# Patient Record
Sex: MALE
[De-identification: names, ages, dates, MRNs, and addresses within clinical notes are randomized per-mention and may not be internally consistent; named-entity substitution may affect disease eponyms.]

---

## 2017-10-15 ENCOUNTER — TRANSCRIPTION ENCOUNTER (OUTPATIENT)
Age: 47
End: 2017-10-15

## 2017-10-15 ENCOUNTER — EMERGENCY (EMERGENCY)
Facility: HOSPITAL | Age: 47
LOS: 0 days | Discharge: HOME | End: 2017-10-16
Attending: EMERGENCY MEDICINE | Admitting: EMERGENCY MEDICINE
Payer: MEDICAID

## 2017-10-15 VITALS
OXYGEN SATURATION: 100 % | RESPIRATION RATE: 17 BRPM | TEMPERATURE: 98 F | WEIGHT: 169.98 LBS | SYSTOLIC BLOOD PRESSURE: 124 MMHG | HEART RATE: 69 BPM | HEIGHT: 70 IN | DIASTOLIC BLOOD PRESSURE: 78 MMHG

## 2017-10-15 DIAGNOSIS — R07.9 CHEST PAIN, UNSPECIFIED: ICD-10-CM

## 2017-10-15 LAB
ADD ON TEST-SPECIMEN IN LAB: SIGNIFICANT CHANGE UP
ALBUMIN SERPL ELPH-MCNC: 3.4 G/DL — SIGNIFICANT CHANGE UP (ref 3.3–5)
ALP SERPL-CCNC: 91 U/L — SIGNIFICANT CHANGE UP (ref 40–120)
ALT FLD-CCNC: 74 U/L — SIGNIFICANT CHANGE UP (ref 12–78)
ANION GAP SERPL CALC-SCNC: 5 MMOL/L — SIGNIFICANT CHANGE UP (ref 5–17)
APTT BLD: 37.4 SEC — SIGNIFICANT CHANGE UP (ref 27.5–37.4)
AST SERPL-CCNC: 32 U/L — SIGNIFICANT CHANGE UP (ref 15–37)
BASOPHILS # BLD AUTO: 0.1 K/UL — SIGNIFICANT CHANGE UP (ref 0–0.2)
BASOPHILS NFR BLD AUTO: 1.4 % — SIGNIFICANT CHANGE UP (ref 0–2)
BILIRUB SERPL-MCNC: 0.3 MG/DL — SIGNIFICANT CHANGE UP (ref 0.2–1.2)
BUN SERPL-MCNC: 13 MG/DL — SIGNIFICANT CHANGE UP (ref 7–23)
CALCIUM SERPL-MCNC: 8.8 MG/DL — SIGNIFICANT CHANGE UP (ref 8.5–10.1)
CHLORIDE SERPL-SCNC: 104 MMOL/L — SIGNIFICANT CHANGE UP (ref 96–108)
CO2 SERPL-SCNC: 29 MMOL/L — SIGNIFICANT CHANGE UP (ref 22–31)
CREAT SERPL-MCNC: 0.94 MG/DL — SIGNIFICANT CHANGE UP (ref 0.5–1.3)
D DIMER BLD IA.RAPID-MCNC: <150 NG/ML DDU — SIGNIFICANT CHANGE UP
EOSINOPHIL # BLD AUTO: 0.4 K/UL — SIGNIFICANT CHANGE UP (ref 0–0.5)
EOSINOPHIL NFR BLD AUTO: 3.9 % — SIGNIFICANT CHANGE UP (ref 0–6)
GLUCOSE SERPL-MCNC: 93 MG/DL — SIGNIFICANT CHANGE UP (ref 70–99)
HCT VFR BLD CALC: 43.3 % — SIGNIFICANT CHANGE UP (ref 39–50)
HGB BLD-MCNC: 14.6 G/DL — SIGNIFICANT CHANGE UP (ref 13–17)
INR BLD: 1.04 RATIO — SIGNIFICANT CHANGE UP (ref 0.88–1.16)
LIDOCAIN IGE QN: 91 U/L — SIGNIFICANT CHANGE UP (ref 73–393)
LYMPHOCYTES # BLD AUTO: 4.9 K/UL — HIGH (ref 1–3.3)
LYMPHOCYTES # BLD AUTO: 45.2 % — HIGH (ref 13–44)
MCHC RBC-ENTMCNC: 28.8 PG — SIGNIFICANT CHANGE UP (ref 27–34)
MCHC RBC-ENTMCNC: 33.8 GM/DL — SIGNIFICANT CHANGE UP (ref 32–36)
MCV RBC AUTO: 85.2 FL — SIGNIFICANT CHANGE UP (ref 80–100)
MONOCYTES # BLD AUTO: 0.8 K/UL — SIGNIFICANT CHANGE UP (ref 0–0.9)
MONOCYTES NFR BLD AUTO: 7.1 % — SIGNIFICANT CHANGE UP (ref 2–14)
NEUTROPHILS # BLD AUTO: 4.6 K/UL — SIGNIFICANT CHANGE UP (ref 1.8–7.4)
NEUTROPHILS NFR BLD AUTO: 42.5 % — LOW (ref 43–77)
NT-PROBNP SERPL-SCNC: 12 PG/ML — SIGNIFICANT CHANGE UP (ref 0–125)
PLATELET # BLD AUTO: 283 K/UL — SIGNIFICANT CHANGE UP (ref 150–400)
POTASSIUM SERPL-MCNC: 4.3 MMOL/L — SIGNIFICANT CHANGE UP (ref 3.5–5.3)
POTASSIUM SERPL-SCNC: 4.3 MMOL/L — SIGNIFICANT CHANGE UP (ref 3.5–5.3)
PROT SERPL-MCNC: 7.4 GM/DL — SIGNIFICANT CHANGE UP (ref 6–8.3)
PROTHROM AB SERPL-ACNC: 11.2 SEC — SIGNIFICANT CHANGE UP (ref 9.8–12.7)
RBC # BLD: 5.08 M/UL — SIGNIFICANT CHANGE UP (ref 4.2–5.8)
RBC # FLD: 12.5 % — SIGNIFICANT CHANGE UP (ref 10.3–14.5)
SODIUM SERPL-SCNC: 138 MMOL/L — SIGNIFICANT CHANGE UP (ref 135–145)
TROPONIN I SERPL-MCNC: <0.015 NG/ML — SIGNIFICANT CHANGE UP (ref 0.01–0.04)
TROPONIN I SERPL-MCNC: <0.015 NG/ML — SIGNIFICANT CHANGE UP (ref 0.01–0.04)
WBC # BLD: 10.8 K/UL — HIGH (ref 3.8–10.5)
WBC # FLD AUTO: 10.8 K/UL — HIGH (ref 3.8–10.5)

## 2017-10-15 PROCEDURE — 93010 ELECTROCARDIOGRAM REPORT: CPT

## 2017-10-15 PROCEDURE — 71010: CPT | Mod: 26

## 2017-10-15 PROCEDURE — 99285 EMERGENCY DEPT VISIT HI MDM: CPT

## 2017-10-15 RX ORDER — SODIUM CHLORIDE 9 MG/ML
3 INJECTION INTRAMUSCULAR; INTRAVENOUS; SUBCUTANEOUS ONCE
Refills: 0 | Status: COMPLETED | OUTPATIENT
Start: 2017-10-15 | End: 2017-10-15

## 2017-10-15 RX ADMIN — SODIUM CHLORIDE 3 MILLILITER(S): 9 INJECTION INTRAMUSCULAR; INTRAVENOUS; SUBCUTANEOUS at 17:43

## 2017-10-15 NOTE — ED ADULT NURSE NOTE - OBJECTIVE STATEMENT
pt BIBA complaining of chest pressure. pt was sent in from urgent care.  pt currently without complaint. pt states that he began having chest pressure on the left side of his chest

## 2017-10-15 NOTE — ED ADULT NURSE REASSESSMENT NOTE - NS ED NURSE REASSESS COMMENT FT1
pt resting comfortably in bed. no complaints at present. VSS. will continue to monitor. safety maintained

## 2017-10-15 NOTE — ED PROVIDER NOTE - OBJECTIVE STATEMENT
47 y/o male presents to the ED c/o Chest pressure while driving today. Pressure lasted about 30 minutes. Pt does not feel pressure in the ED. Denies SOB, NVD, fever, lightheadedness. Similar sx about a month ago. Went to a doctor who did an EKG with negative results. Pt works as a  and denies change in his ability to work or SOB while working. Pt went to [place] a month ago. Family hx of cardiac stents. Takes ASA

## 2017-10-16 LAB — TROPONIN I SERPL-MCNC: <0.015 NG/ML — SIGNIFICANT CHANGE UP (ref 0.01–0.04)

## 2019-05-03 ENCOUNTER — EMERGENCY (EMERGENCY)
Facility: HOSPITAL | Age: 49
LOS: 0 days | Discharge: HOME | End: 2019-05-03
Attending: EMERGENCY MEDICINE | Admitting: EMERGENCY MEDICINE
Payer: MEDICAID

## 2019-05-03 VITALS
WEIGHT: 220.02 LBS | SYSTOLIC BLOOD PRESSURE: 158 MMHG | RESPIRATION RATE: 17 BRPM | OXYGEN SATURATION: 93 % | TEMPERATURE: 99 F | HEART RATE: 106 BPM | DIASTOLIC BLOOD PRESSURE: 104 MMHG | HEIGHT: 66 IN

## 2019-05-03 DIAGNOSIS — E11.9 TYPE 2 DIABETES MELLITUS WITHOUT COMPLICATIONS: ICD-10-CM

## 2019-05-03 DIAGNOSIS — B34.9 VIRAL INFECTION, UNSPECIFIED: ICD-10-CM

## 2019-05-03 DIAGNOSIS — J02.9 ACUTE PHARYNGITIS, UNSPECIFIED: ICD-10-CM

## 2019-05-03 DIAGNOSIS — R05 COUGH: ICD-10-CM

## 2019-05-03 DIAGNOSIS — E78.5 HYPERLIPIDEMIA, UNSPECIFIED: ICD-10-CM

## 2019-05-03 PROCEDURE — 99284 EMERGENCY DEPT VISIT MOD MDM: CPT

## 2019-05-03 NOTE — ED PROVIDER NOTE - CLINICAL SUMMARY MEDICAL DECISION MAKING FREE TEXT BOX
49 yo male presents with sore throat x 1 week. Pharynx erythematous without exudates. Likely viral in nature. Discussed the importance of rest, hydration,a nd tylenol for fever or pain. -Hayden Butterfield PA-C

## 2019-05-03 NOTE — ED PROVIDER NOTE - ATTENDING CONTRIBUTION TO CARE
I, Corey Null, performed the initial face to face bedside interview with this patient regarding history of present illness, review of symptoms and relevant past medical, social and family history.  I completed an independent physical examination.  I was the initial provider who evaluated this patient. I have signed out the follow up of any pending tests (i.e. labs, radiological studies) to the ACP.  I have communicated the patient’s plan of care and disposition with the ACP.      pt with subjective fever, cough, sore throat x 1 wk.   multiple sick contacts.   pt well appearing likely viral.   no visible exudate.   advised to continue with mucinex, PO fluids and f/u with PMD

## 2019-05-03 NOTE — ED ADULT TRIAGE NOTE - CHIEF COMPLAINT QUOTE
Pt presents to ER c/o sore throat/cough/fever x one week. Pt reports pain is mostly on the right side of throat and painful when swallowing. Cough is productive

## 2019-05-03 NOTE — ED PROVIDER NOTE - OBJECTIVE STATEMENT
47 yo male with a PMH of DM, HLD presents with nonproductive cough, sore throat x 1 week. Pt was taking mucinex which helped with symptoms and returned with worsening sore throat, pain with swallowing. +sub fever, dry mouth.  States his neighbor and coworker were also sick. Denies cp, sob, ear pain, rhinorrhea, sweating, chills.

## 2019-05-03 NOTE — ED PROVIDER NOTE - NSFOLLOWUPINSTRUCTIONS_ED_ALL_ED_FT
Viral Respiratory Infection  A viral respiratory infection is an illness that affects parts of the body used for breathing, like the lungs, nose, and throat. It is caused by a germ called a virus.    ImageSome examples of this kind of infection are:    A cold.  The flu (influenza).  A respiratory syncytial virus (RSV) infection.    How do I know if I have this infection?  Most of the time this infection causes:    A stuffy or runny nose.  Yellow or green fluid in the nose.  A cough.  Sneezing.  Tiredness (fatigue).  Achy muscles.  A sore throat.  Sweating or chills.  A fever.  A headache.    How is this infection treated?  If the flu is diagnosed early, it may be treated with an antiviral medicine. This medicine shortens the length of time a person has symptoms. Symptoms may be treated with over-the-counter and prescription medicines, such as:    Expectorants. These make it easier to cough up mucus.  Decongestant nasal sprays.    Doctors do not prescribe antibiotic medicines for viral infections. They do not work with this kind of infection.    How do I know if I should stay home?  To keep others from getting sick, stay home if you have:    A fever.  A lasting cough.  A sore throat.  A runny nose.  Sneezing.  Muscles aches.  Headaches.  Tiredness.  Weakness.  Chills.  Sweating.  An upset stomach (nausea).    Follow these instructions at home:  Rest as much as possible.  Take over-the-counter and prescription medicines only as told by your doctor.  Drink enough fluid to keep your pee (urine) clear or pale yellow.  Gargle with salt water. Do this 3–4 times per day or as needed. To make a salt–water mixture, dissolve ½–1 tsp of salt in 1 cup of warm water. Make sure the salt dissolves all the way.  Use nose drops made from salt water. This helps with stuffiness (congestion). It also helps soften the skin around your nose.  Do not drink alcohol.  Do not use tobacco products, including cigarettes, chewing tobacco, and e-cigarettes. If you need help quitting, ask your doctor.  Get help if:  Your symptoms last for 10 days or longer.  Your symptoms get worse over time.  You have a fever.  You have very bad pain in your face or forehead.  Parts of your jaw or neck become very swollen.  Get help right away if:  You feel pain or pressure in your chest.  You have shortness of breath.  You faint or feel like you will faint.  You keep throwing up (vomiting).  You feel confused.  This information is not intended to replace advice given to you by your health care provider. Make sure you discuss any questions you have with your health care provider.;

## 2019-05-03 NOTE — ED ADULT NURSE NOTE - OBJECTIVE STATEMENT
pt presents to ED c/o sore throat and cough x 1 week. + sick contacts at home., pt endorses taking mucinex at home with relief of symptoms. pt endorses lack of sleep at home. pt denies chest pain , denies sob, denies chills. pt is afebrile, pt is ambulatory. pt breathing even and unlabored. pt in no distress.

## 2022-07-12 NOTE — ED ADULT NURSE NOTE - CHIEF COMPLAINT
Local Anesthesia Pre Procedure Assessment    Informed Consent:    Consent Obtained: Yes     Procedure Assessment:    Preop Diagnosis/Indications for Procedure: Pain  Planned Procedure: SIJ  Planned Anesthetic: Local    Medical History/Comorbid Conditions:    Significant Medical/Surgical History: Yes (comment) (See H&P Note)  Normal Mental Status: Yes    Examination Pertinent to Procedure Being Performed:    Evaluation of Operative Site: Clean, no deformity, redness/warmth/swelling, no masses    Other Findings:    Reviewed Current Medications and Allergies: Yes    Pertinent Lab/Diagnostic Tests:    Pertinent Lab / Diagnostic Tests: Other (comment) (See Imaging section)    Nadira Mack MD  07/12/22 The patient is a 48y Male complaining of cold symptoms.

## 2023-08-22 ENCOUNTER — OFFICE (OUTPATIENT)
Dept: URBAN - METROPOLITAN AREA CLINIC 6 | Facility: CLINIC | Age: 53
Setting detail: OPHTHALMOLOGY
End: 2023-08-22
Payer: MEDICAID

## 2023-08-22 DIAGNOSIS — E11.9: ICD-10-CM

## 2023-08-22 PROCEDURE — 92004 COMPRE OPH EXAM NEW PT 1/>: CPT | Performed by: OPHTHALMOLOGY

## 2023-08-22 ASSESSMENT — REFRACTION_AUTOREFRACTION
OS_CYLINDER: -0.25
OS_SPHERE: +0.50
OD_SPHERE: +0.50
OD_CYLINDER: -0.50
OS_AXIS: 130
OD_AXIS: 009

## 2023-08-22 ASSESSMENT — REFRACTION_MANIFEST
OD_VA2: 20/30
OD_ADD: +2.50
OS_SPHERE: PLANO
OD_SPHERE: PLANO
OS_VA2: 20/30
OS_ADD: +2.50

## 2023-08-22 ASSESSMENT — KERATOMETRY
OD_K1POWER_DIOPTERS: 41.25
OS_AXISANGLE_DEGREES: 081
OD_K2POWER_DIOPTERS: 41.75
OS_K1POWER_DIOPTERS: 41.50
OD_AXISANGLE_DEGREES: 092
OS_K2POWER_DIOPTERS: 42.00

## 2023-08-22 ASSESSMENT — VISUAL ACUITY
OD_BCVA: 20/20
OS_BCVA: 20/20

## 2023-08-22 ASSESSMENT — AXIALLENGTH_DERIVED
OD_AL: 24.2468
OS_AL: 24.0993

## 2023-08-22 ASSESSMENT — SPHEQUIV_DERIVED
OS_SPHEQUIV: 0.375
OD_SPHEQUIV: 0.25

## 2023-08-22 ASSESSMENT — CONFRONTATIONAL VISUAL FIELD TEST (CVF)
OS_FINDINGS: FULL
OD_FINDINGS: FULL

## 2023-08-22 ASSESSMENT — TONOMETRY
OS_IOP_MMHG: 16
OD_IOP_MMHG: 15
